# Patient Record
Sex: FEMALE | Race: BLACK OR AFRICAN AMERICAN | NOT HISPANIC OR LATINO | Employment: STUDENT | URBAN - METROPOLITAN AREA
[De-identification: names, ages, dates, MRNs, and addresses within clinical notes are randomized per-mention and may not be internally consistent; named-entity substitution may affect disease eponyms.]

---
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
## 2022-01-08 NOTE — PROGRESS NOTES
Assessment/Plan:    Microbiology Results:    Wound Culture 2+ Growth of Proteus mirabilis Abnormal        2+ Growth of     Mixed Skin Ledy            GRAM STAIN RESULT   Abnormal   2+ Polys      1+ Gram negative rods                Susceptibility     Proteus mirabilis     FAMILIA     Ampicillin ($$) <=8 00 ug/ml Susceptible     Aztreonam ($$$)  <=4 ug/ml Susceptible     Cefazolin ($) <=2 00 ug/ml Susceptible     Ciprofloxacin ($)  <=0 25 ug/ml Susceptible     Gentamicin ($$) <=2 ug/ml Susceptible     Levofloxacin ($) <=0 50 ug/ml Susceptible     Tetracycline >8 ug/ml Resistant     Tobramycin ($) <=2 ug/ml Susceptible     Trimethoprim + Sulfamethoxazole ($$$) <=0 5/9 5 u  Susceptible     ZID Performed Yes                      1  Axillary abscess      Wound has healed nicely  She is discharged and can followup if needed  Subjective:      Patient ID: Francisco Santillan is a 25 y o  female  Triage Notes:    Beni Card is following up for wound check after operative incision and debridement of a large left axillary abscess  She reports doing well  Her mom also thinks her wound has healed  She has not had any ongoing pain, fevers/chills or drainage  The following portions of the patient's history were reviewed and updated as appropriate: allergies, current medications, past family history, past medical history, past social history, past surgical history and problem list     Review of Systems      Objective:      /84   Pulse (!) 108   Temp 98 °F (36 7 °C) (Temporal)   Resp 16   Ht 5' 11" (1 803 m)   Wt 114 kg (252 lb)   BMI 35 15 kg/m²     Below is the patient's most recent value for Albumin, ALT, AST, BUN, Calcium, Chloride, Cholesterol, CO2, Creatinine, GFR, Glucose, HDL, Hematocrit, Hemoglobin, Hemoglobin A1C, LDL, Magnesium, Phosphorus, Platelets, Potassium, PSA, Sodium, Triglycerides, and WBC     Lab Results   Component Value Date    ALT 92 (H) 10/26/2021     (H) 10/26/2021    BUN 6 10/26/2021 CALCIUM 9 1 10/26/2021     10/26/2021    CO2 23 10/26/2021    CREATININE 0 76 10/26/2021    HCT 29 3 (L) 10/27/2021    HGB 9 1 (L) 10/27/2021    HGBA1C 9 3 (H) 10/23/2021    MG 2 5 10/26/2021    PHOS 5 1 (H) 10/26/2021     10/27/2021    K 3 4 (L) 10/26/2021    WBC 6 85 10/27/2021     Note: for a comprehensive list of the patient's lab results, access the Results Review activity  Physical Exam  Vitals and nursing note reviewed  Constitutional:       General: She is not in acute distress  Appearance: She is well-developed  She is not diaphoretic  HENT:      Head: Normocephalic and atraumatic  Eyes:      Pupils: Pupils are equal, round, and reactive to light  Cardiovascular:      Rate and Rhythm: Normal rate and regular rhythm  Pulmonary:      Effort: Pulmonary effort is normal  No respiratory distress  Abdominal:      Palpations: Abdomen is soft  Musculoskeletal:         General: Normal range of motion  Cervical back: Normal range of motion and neck supple  Skin:     General: Skin is warm and dry  Comments: There is no cellulitis  No active drainage  Wound has closed  No cover needed  Neurological:      Mental Status: She is alert and oriented to person, place, and time               Procedures

## 2023-09-20 ENCOUNTER — OFFICE VISIT (OUTPATIENT)
Dept: URGENT CARE | Facility: CLINIC | Age: 23
End: 2023-09-20
Payer: COMMERCIAL

## 2023-09-20 VITALS
TEMPERATURE: 97.6 F | RESPIRATION RATE: 20 BRPM | OXYGEN SATURATION: 98 % | HEART RATE: 118 BPM | SYSTOLIC BLOOD PRESSURE: 168 MMHG | DIASTOLIC BLOOD PRESSURE: 88 MMHG

## 2023-09-20 DIAGNOSIS — B96.89 LOCALIZED BACTERIAL SKIN INFECTION: ICD-10-CM

## 2023-09-20 DIAGNOSIS — L02.214 ABSCESS OF LEFT GROIN: Primary | ICD-10-CM

## 2023-09-20 DIAGNOSIS — L08.9 LOCALIZED BACTERIAL SKIN INFECTION: ICD-10-CM

## 2023-09-20 PROCEDURE — 99213 OFFICE O/P EST LOW 20 MIN: CPT | Performed by: NURSE PRACTITIONER

## 2023-09-20 PROCEDURE — 10060 I&D ABSCESS SIMPLE/SINGLE: CPT | Performed by: NURSE PRACTITIONER

## 2023-09-20 RX ORDER — MULTIVIT WITH MINERALS/LUTEIN
1000 TABLET ORAL DAILY
COMMUNITY

## 2023-09-20 RX ORDER — SULFAMETHOXAZOLE AND TRIMETHOPRIM 800; 160 MG/1; MG/1
1 TABLET ORAL EVERY 12 HOURS SCHEDULED
Qty: 20 TABLET | Refills: 0 | Status: SHIPPED | OUTPATIENT
Start: 2023-09-20 | End: 2023-09-30

## 2023-09-20 RX ORDER — COVID-19 ANTIGEN TEST
KIT MISCELLANEOUS
COMMUNITY

## 2023-09-20 RX ORDER — DIPHENOXYLATE HYDROCHLORIDE AND ATROPINE SULFATE 2.5; .025 MG/1; MG/1
1 TABLET ORAL DAILY
COMMUNITY

## 2023-09-20 RX ORDER — VIT C/B6/B5/MAGNESIUM/HERB 173 50-5-6-5MG
CAPSULE ORAL
COMMUNITY

## 2023-09-20 RX ORDER — CEPHALEXIN 500 MG/1
500 CAPSULE ORAL EVERY 8 HOURS SCHEDULED
Qty: 21 CAPSULE | Refills: 0 | Status: SHIPPED | OUTPATIENT
Start: 2023-09-20 | End: 2023-09-27

## 2023-09-20 RX ORDER — CHLORAL HYDRATE 500 MG
1000 CAPSULE ORAL DAILY
COMMUNITY

## 2023-09-20 NOTE — LETTER
September 20, 2023     Patient: Woodrow Felix   YOB: 2000   Date of Visit: 9/20/2023       To Whom It May Concern: It is my medical opinion that Woodrow Felix may return to work on 9/25. Please excuse for time missed this week . If you have any questions or concerns, please don't hesitate to call.          Sincerely,        JATIN Wahl    CC: No Recipients

## 2023-09-20 NOTE — PATIENT INSTRUCTIONS
After 24 hours, symptoms should not get worse. It may take a few days to start to see improvement. If symptoms are worsening after 24 hours or if you are not seeing improvement, you should be seen again. Abscess   AMBULATORY CARE:   An abscess  is an area under the skin where pus (infected fluid) collects. An abscess is often caused by bacteria, fungi or other germs that get into an open wound. You can get an abscess anywhere on your body. Common signs and symptoms of an abscess: You may have a swollen mass that is red and painful. Pus may leak out of the mass. The pus will be white or yellow and may smell bad. You may have redness and pain days before the mass appears. You may have a fever and chills if the infection spreads. Seek immediate care if:   The area around your abscess becomes very painful, warm, or has red streaks. You have a fever and chills. Your heart is beating faster than usual.    You feel faint or confused. Call your doctor if:   Your abscess gets bigger or does not get better. Your abscess returns. You have questions or concerns about your condition or care. Treatment for an abscess: Your healthcare provider may need to make a cut in the abscess to allow the pus to drain. You may need surgery to remove your abscess. You may  need any of the following:  Antibiotics  help treat a bacterial infection. Acetaminophen  decreases pain and fever. It is available without a doctor's order. Ask how much to take and how often to take it. Follow directions. Read the labels of all other medicines you are using to see if they also contain acetaminophen, or ask your doctor or pharmacist. Acetaminophen can cause liver damage if not taken correctly. NSAIDs , such as ibuprofen, help decrease swelling, pain, and fever. This medicine is available with or without a doctor's order. NSAIDs can cause stomach bleeding or kidney problems in certain people.  If you take blood thinner medicine, always ask your healthcare provider if NSAIDs are safe for you. Always read the medicine label and follow directions. Take your medicine as directed. Contact your healthcare provider if you think your medicine is not helping or if you have side effects. Tell your provider if you are allergic to any medicine. Keep a list of the medicines, vitamins, and herbs you take. Include the amounts, and when and why you take them. Bring the list or the pill bottles to follow-up visits. Carry your medicine list with you in case of an emergency. Self-care:   Apply a warm compress to your abscess. This will help it open and drain. Wet a washcloth in warm, but not hot, water. Apply the compress for 10 minutes. Repeat this 4 times each day. Do not  press on an abscess or try to open it with a needle. You may push the bacteria deeper or into your blood. Do not share your clothes, towels, or sheets with anyone. This can spread the infection to others. Wash your hands often. This can help prevent the spread of germs. Use soap and water or an alcohol-based hand rub. Care for your wound after it is drained:   Care for your wound as directed. If your healthcare provider says it is okay, carefully remove the bandage and gauze packing. You may need to soak the gauze to get it out of your wound. Clean your wound and the area around it as directed. Dry the area and put on new, clean bandages. Change your bandages when they get wet or dirty. Ask your healthcare provider how to change the gauze in your wound. Keep track of how many pieces of gauze are placed inside the wound. Do not put too much packing in the wound. Do not pack the gauze too tightly in your wound. Follow up with your healthcare provider in 1 to 3 days: You may need to have your packing removed or your bandage changed. Write down your questions so you remember to ask them during your visits.   © Copyright [employer] 2023 Information is for End User's use only and may not be sold, redistributed or otherwise used for commercial purposes. The above information is an  only. It is not intended as medical advice for individual conditions or treatments. Talk to your doctor, nurse or pharmacist before following any medical regimen to see if it is safe and effective for you.

## 2023-09-20 NOTE — PROGRESS NOTES
[facility] [place] [facility]        NAME: Jaren Johnston is a 26 y.o. female  : 2000    MRN: [id number]  DATE: 2023  TIME: 10:50 AM      Assessment and Plan     Abscess of left groin [L02.214]  1. Abscess of left groin  sulfamethoxazole-trimethoprim (BACTRIM DS) 800-160 mg per tablet    cephalexin (KEFLEX) 500 mg capsule    Incision and drain      2. Localized bacterial skin infection  sulfamethoxazole-trimethoprim (BACTRIM DS) 800-160 mg per tablet    cephalexin (KEFLEX) 500 mg capsule    Incision and drain        Incision and drain    Date/Time: 2023 10:20 AM    Performed by: JATIN Guerrero  Authorized by: JATIN Guerrero  Universal Protocol:  Consent: Verbal consent obtained. Risks and benefits: risks, benefits and alternatives were discussed  Consent given by: patient  Time out: Immediately prior to procedure a "time out" was called to verify the correct patient, procedure, equipment, support staff and site/side marked as required. Timeout called at: 2023 10:20 AM.  Patient understanding: patient states understanding of the procedure being performed  Required items: required blood products, implants, devices, and special equipment available  Patient identity confirmed: verbally with patient      Patient location:  Clinic  Location:     Type:  Abscess    Size:  4 cm x 3 cm    Location:  Trunk    Trunk location:  Abdomen (left upper suprapubic)  Pre-procedure details:     Skin preparation:  Betadine  Anesthesia (see MAR for exact dosages): Anesthesia method: freeze spray. Procedure details:     Complexity:  Simple    Needle aspiration: no      Incision types:  Stab incision    Scalpel blade:  15    Approach:  Puncture    Incision depth:  Subcutaneous    Wound management:  Irrigated with saline    Irrigation with saline:  10    Drainage:  Bloody    Drainage amount:   Moderate    Wound treatment:  Wound left open (folded 4x4 and tape placed over top)    Packing materials:  None  Post-procedure details:     Patient tolerance of procedure: Tolerated well, no immediate complications      Patient verbally instructed to do warm compresses several times per day. Patient Instructions     Patient Instructions     After 24 hours, symptoms should not get worse. It may take a few days to start to see improvement. If symptoms are worsening after 24 hours or if you are not seeing improvement, you should be seen again. Abscess   AMBULATORY CARE:   An abscess  is an area under the skin where pus (infected fluid) collects. An abscess is often caused by bacteria, fungi or other germs that get into an open wound. You can get an abscess anywhere on your body. Common signs and symptoms of an abscess: You may have a swollen mass that is red and painful. Pus may leak out of the mass. The pus will be white or yellow and may smell bad. You may have redness and pain days before the mass appears. You may have a fever and chills if the infection spreads. Seek immediate care if:   • The area around your abscess becomes very painful, warm, or has red streaks. • You have a fever and chills. • Your heart is beating faster than usual.    • You feel faint or confused. Call your doctor if:   • Your abscess gets bigger or does not get better. • Your abscess returns. • You have questions or concerns about your condition or care. Treatment for an abscess: Your healthcare provider may need to make a cut in the abscess to allow the pus to drain. You may need surgery to remove your abscess. You may  need any of the following:  • Antibiotics  help treat a bacterial infection. • Acetaminophen  decreases pain and fever. It is available without a doctor's order. Ask how much to take and how often to take it. Follow directions.  Read the labels of all other medicines you are using to see if they also contain acetaminophen, or ask your doctor or pharmacist. Acetaminophen can cause liver damage if not taken correctly. • NSAIDs , such as ibuprofen, help decrease swelling, pain, and fever. This medicine is available with or without a doctor's order. NSAIDs can cause stomach bleeding or kidney problems in certain people. If you take blood thinner medicine, always ask your healthcare provider if NSAIDs are safe for you. Always read the medicine label and follow directions. • Take your medicine as directed. Contact your healthcare provider if you think your medicine is not helping or if you have side effects. Tell your provider if you are allergic to any medicine. Keep a list of the medicines, vitamins, and herbs you take. Include the amounts, and when and why you take them. Bring the list or the pill bottles to follow-up visits. Carry your medicine list with you in case of an emergency. Self-care:   • Apply a warm compress to your abscess. This will help it open and drain. Wet a washcloth in warm, but not hot, water. Apply the compress for 10 minutes. Repeat this 4 times each day. Do not  press on an abscess or try to open it with a needle. You may push the bacteria deeper or into your blood. • Do not share your clothes, towels, or sheets with anyone. This can spread the infection to others. • Wash your hands often. This can help prevent the spread of germs. Use soap and water or an alcohol-based hand rub. Care for your wound after it is drained:   • Care for your wound as directed. If your healthcare provider says it is okay, carefully remove the bandage and gauze packing. You may need to soak the gauze to get it out of your wound. Clean your wound and the area around it as directed. Dry the area and put on new, clean bandages. Change your bandages when they get wet or dirty. • Ask your healthcare provider how to change the gauze in your wound. Keep track of how many pieces of gauze are placed inside the wound. Do not put too much packing in the wound.  Do not pack the gauze too tightly in your wound. Follow up with your healthcare provider in 1 to 3 days: You may need to have your packing removed or your bandage changed. Write down your questions so you remember to ask them during your visits. © Copyright Claudia Dhillon 2023 Information is for End User's use only and may not be sold, redistributed or otherwise used for commercial purposes. The above information is an  only. It is not intended as medical advice for individual conditions or treatments. Talk to your doctor, nurse or pharmacist before following any medical regimen to see if it is safe and effective for you. Follow up with PCP in 3-5 days. Proceed to  ER if symptoms worsen. Chief Complaint     Chief Complaint   Patient presents with   • Abdominal Pain     Pain to left lower abdomen x7 days. Lump to left lower abdomen x7 days. Patient reports it was a rash and first and then became lump. Had this before underneath arm pit. Lump is progressively getting larger. 5/10 pain to area. Describes a warm sensation at site         History of Present Illness     Patient presents accompanied by mother. Patient reports an area LLQ/upper left suprapubic that initially looked like a rash but has become a lump--reddened, warm, painful. This seems to be getting larger. Castor oil to the site has not relieved symptoms. Patient thinks this started as an ingrown hair. They note something similar in 2021 in her armpit. They also note the initial medication (doxy) did not work and that she had to be switched after going to the hospital (bactrim and keflex). Has taken aleve, 1-2 tabs every 12 hours prn a few times with slight improvement in pain. Review of Systems     Review of Systems   Skin: Positive for color change. Painful lump   All other systems reviewed and are negative.         Current Medications       Current Outpatient Medications:   •  Ascorbic Acid (vitamin C) 1000 MG tablet, Take 1,000 mg by mouth daily, Disp: , Rfl:   •  cephalexin (KEFLEX) 500 mg capsule, Take 1 capsule (500 mg total) by mouth every 8 (eight) hours for 7 days, Disp: 21 capsule, Rfl: 0  •  multivitamin (THERAGRAN) TABS, Take 1 tablet by mouth daily, Disp: , Rfl:   •  Naproxen Sodium (Aleve) 220 MG CAPS, Take by mouth, Disp: , Rfl:   •  Omega-3 Fatty Acids (fish oil) 1,000 mg, Take 1,000 mg by mouth daily, Disp: , Rfl:   •  sulfamethoxazole-trimethoprim (BACTRIM DS) 800-160 mg per tablet, Take 1 tablet by mouth every 12 (twelve) hours for 10 days, Disp: 20 tablet, Rfl: 0  •  Turmeric 500 MG CAPS, Take by mouth, Disp: , Rfl:   •  ibuprofen (MOTRIN) 400 mg tablet, Take 1 tablet (400 mg total) by mouth every 6 (six) hours as needed for mild pain, fever or headaches for up to 5 days (Patient not taking: Reported on 9/20/2023), Disp: 20 tablet, Rfl: 0    Current Allergies     Allergies as of 09/20/2023   • (No Known Allergies)              The following portions of the patient's history were reviewed and updated as appropriate: allergies, current medications, past family history, past medical history, past social history, past surgical history and problem list.     History reviewed. No pertinent past medical history. Past Surgical History:   Procedure Laterality Date   • INCISION AND DRAINAGE OF WOUND Right 10/26/2021    Procedure: INCISION AND DRAINAGE (I&D) EXTREMITY;  Surgeon: Aman Penn MD;  Location: [facility];  Service: General       Family History   Problem Relation Age of Onset   • Asthma Father         as a child   • Hypertension Mother    • Hypertension Paternal Grandmother    • Hypertension Paternal Grandfather    • Hypertension Maternal Grandmother    • Hypertension Maternal Grandfather    • No Known Problems Sister    • No Known Problems Sister    • No Known Problems Sister          Medications have been verified.         Objective     /88   Pulse (!) 118   Temp 97.6 °F (36.4 °C)   Resp 20   LMP 09/11/2023 (Approximate)   SpO2 98%   Patient's last menstrual period was 09/11/2023 (approximate). Physical Exam     Physical Exam  Vitals and nursing note reviewed. Constitutional:       General: She is not in acute distress. Appearance: She is well-developed. She is not ill-appearing, toxic-appearing or diaphoretic. HENT:      Head: Normocephalic and atraumatic. Eyes:      Pupils: Pupils are equal, round, and reactive to light. Pulmonary:      Effort: Pulmonary effort is normal. No respiratory distress. Abdominal:      General: There is no distension. Palpations: Abdomen is soft. Musculoskeletal:         General: Normal range of motion. Cervical back: Normal range of motion and neck supple. Skin:     General: Skin is warm and dry. Capillary Refill: Capillary refill takes less than 2 seconds. Findings: Abscess and erythema present. Neurological:      General: No focal deficit present. Mental Status: She is alert and oriented to person, place, and time. Psychiatric:         Mood and Affect: Mood normal.         Behavior: Behavior normal.         Thought Content:  Thought content normal.         Judgment: Judgment normal.

## 2024-05-05 ENCOUNTER — AMB VIDEO VISIT (OUTPATIENT)
Dept: OTHER | Facility: HOSPITAL | Age: 24
End: 2024-05-05

## 2024-05-05 VITALS — BODY MASS INDEX: 36.57 KG/M2 | RESPIRATION RATE: 18 BRPM | WEIGHT: 270 LBS | HEIGHT: 72 IN | HEART RATE: 116 BPM

## 2024-05-05 DIAGNOSIS — L02.214 ABSCESS OF RIGHT GROIN: Primary | ICD-10-CM

## 2024-05-05 DIAGNOSIS — E11.9 TYPE 2 DIABETES MELLITUS WITHOUT COMPLICATION, WITHOUT LONG-TERM CURRENT USE OF INSULIN (HCC): ICD-10-CM

## 2024-05-05 PROBLEM — E87.20 LACTIC ACIDOSIS: Status: RESOLVED | Noted: 2021-10-24 | Resolved: 2024-05-05

## 2024-05-05 PROBLEM — R73.09 ELEVATED RANDOM BLOOD GLUCOSE LEVEL: Status: RESOLVED | Noted: 2021-10-24 | Resolved: 2024-05-05

## 2024-05-05 PROBLEM — L03.111 CELLULITIS OF RIGHT AXILLA: Status: RESOLVED | Noted: 2021-10-24 | Resolved: 2024-05-05

## 2024-05-05 PROCEDURE — ECARE PR SL URGENT CARE VIRTUAL VISIT: Performed by: PHYSICIAN ASSISTANT

## 2024-05-05 RX ORDER — SULFAMETHOXAZOLE AND TRIMETHOPRIM 800; 160 MG/1; MG/1
1 TABLET ORAL EVERY 12 HOURS SCHEDULED
Qty: 14 TABLET | Refills: 0 | Status: SHIPPED | OUTPATIENT
Start: 2024-05-05 | End: 2024-05-12

## 2024-05-05 NOTE — PATIENT INSTRUCTIONS
"[facility] Phone number is [phone] if you need assistance or have further questions  note in \"Letters\" section of [facility] courtney. Can print if opened from a web browser    Advised patient to go for repeat fasting labs and f/u with PCP. Explained uncontrolled diabetes puts her at increased risk for developing these infections.     You can go to any outpatient [facility]'s Lab to get the blood work drawn  Just provide your name and date of birth at registration and they will be able to pull up the orders.  You can search for a lab using [employer] \"Find Care\" and filter by \"Labs\" or click the link below:  [email or web address]    Call [phone], option 2 to request a mobile lab visit to your home    The results will go directly to your [facility] courtney under \"Test Results\"  You should be able to screenshot the results, or you can print if opened from a web browser    Go to ER for worsening pain, fevers, or no improvement after 2-3 days on antibiotics  "

## 2024-05-05 NOTE — LETTER
May 5, 2024     Patient: Yamilex Wooten   YOB: 2000   Date of Visit: 5/5/2024       To Whom it May Concern:    Yamilex Wooten is under my professional care. She was seen virtually on 5/5/2024. She may return to school on 5/13/24 and may return to work on 5/13/24 .    If you have any questions or concerns, please don't hesitate to call.         Sincerely,          Shannon D Severino, PA-C        CC: No Recipients

## 2024-05-05 NOTE — CARE ANYWHERE EVISITS
Visit Summary for JOHN TREJO - Gender: Female - Date of Birth: 2000  Date: [id number] - Duration: 14 minutes  Patient: JOHN TREJO  Provider: Shannon Severino PA-C    Patient Contact Information  Address  [address] [place]; [place] [phone]    Visit Topics    Triage Questions   What is your current physical address in the event of a medical emergency? Answer []  Are you allergic to any medications? Answer []  Are you now or could you be pregnant? Answer []  Do you have any immune system compromise or chronic lung   disease? Answer []  Do you have any vulnerable family members in the home (infant, pregnant, cancer, elderly)? Answer []     Conversation Transcripts  [0A][0A] [Notification] You are connected with Shannon Severino PA-C, Urgent Care Specialist.[0A][Notification] JOHN TREJO is located in [place].[0A][Notification] JOHN TREJO has shared health history...[0A]    Diagnosis  Cutaneous abscess of groin  Type 2 diabetes mellitus without complications    Procedures  Value: [id number] Code: CPT-4 UNLISTED E&M SERVICE    Medications Prescribed    No prescriptions ordered    Electronically signed by: Severino PA-C, Shannon(NPI [id number])

## 2024-05-12 ENCOUNTER — AMB VIDEO VISIT (OUTPATIENT)
Dept: OTHER | Facility: HOSPITAL | Age: 24
End: 2024-05-12

## 2024-05-12 DIAGNOSIS — E11.9 TYPE 2 DIABETES MELLITUS WITHOUT COMPLICATION, WITHOUT LONG-TERM CURRENT USE OF INSULIN (HCC): ICD-10-CM

## 2024-05-12 DIAGNOSIS — L02.214 ABSCESS OF RIGHT GROIN: Primary | ICD-10-CM

## 2024-05-12 PROCEDURE — ECARE PR SL URGENT CARE VIRTUAL VISIT: Performed by: PHYSICIAN ASSISTANT

## 2024-05-12 NOTE — PATIENT INSTRUCTIONS
Instructed pt to follow up with PCP tomorrow, call the office first thing for appt  If not improving pt needs to have in-person eval, possible culture, etc.  C/w antibiotic, recommend taking it with food and a probiotic for side effects  Keep area clean and dry  Monitor for worsening symptoms including fever  Work note given x 1 day at pt's request    Abscess   AMBULATORY CARE:   An abscess  is an area under the skin where pus (infected fluid) collects. An abscess is often caused by bacteria, fungi or other germs that get into an open wound. You can get an abscess anywhere on your body.       Common signs and symptoms of an abscess:  You may have a swollen mass that is red and painful. Pus may leak out of the mass. The pus will be white or yellow and may smell bad. You may have redness and pain days before the mass appears. You may have a fever and chills if the infection spreads.  Seek immediate care if:   The area around your abscess becomes very painful, warm, or has red streaks.    You have a fever and chills.    Your heart is beating faster than usual.    You feel faint or confused.    Call your doctor if:   Your abscess gets bigger or does not get better.    Your abscess returns.    You have questions or concerns about your condition or care.    Treatment for an abscess:  Your healthcare provider may need to make a cut in the abscess to allow the pus to drain. You may need surgery to remove your abscess. You may  need any of the following:  Antibiotics  help treat a bacterial infection.    Acetaminophen  decreases pain and fever. It is available without a doctor's order. Ask how much to take and how often to take it. Follow directions. Read the labels of all other medicines you are using to see if they also contain acetaminophen, or ask your doctor or pharmacist. Acetaminophen can cause liver damage if not taken correctly.    NSAIDs , such as ibuprofen, help decrease swelling, pain, and fever. This medicine is  available with or without a doctor's order. NSAIDs can cause stomach bleeding or kidney problems in certain people. If you take blood thinner medicine, always ask your healthcare provider if NSAIDs are safe for you. Always read the medicine label and follow directions.    Take your medicine as directed.  Contact your healthcare provider if you think your medicine is not helping or if you have side effects. Tell your provider if you are allergic to any medicine. Keep a list of the medicines, vitamins, and herbs you take. Include the amounts, and when and why you take them. Bring the list or the pill bottles to follow-up visits. Carry your medicine list with you in case of an emergency.    Self-care:   Apply a warm compress to your abscess.  This will help it open and drain. Wet a washcloth in warm, but not hot, water. Apply the compress for 10 minutes. Repeat this 4 times each day. Do not  press on an abscess or try to open it with a needle. You may push the bacteria deeper or into your blood.    Do not share your clothes, towels, or sheets with anyone.  This can spread the infection to others.    Wash your hands often.  This can help prevent the spread of germs. Use soap and water or an alcohol-based hand rub.       Care for your wound after it is drained:   Care for your wound as directed.  If your healthcare provider says it is okay, carefully remove the bandage and gauze packing. You may need to soak the gauze to get it out of your wound. Clean your wound and the area around it as directed. Dry the area and put on new, clean bandages. Change your bandages when they get wet or dirty.    Ask your healthcare provider how to change the gauze in your wound.  Keep track of how many pieces of gauze are placed inside the wound. Do not put too much packing in the wound. Do not pack the gauze too tightly in your wound.    Follow up with your healthcare provider in 1 to 3 days:  You may need to have your packing removed or  your bandage changed. Write down your questions so you remember to ask them during your visits.  © Copyright [employer] 2023 Information is for End User's use only and may not be sold, redistributed or otherwise used for commercial purposes.  The above information is an  only. It is not intended as medical advice for individual conditions or treatments. Talk to your doctor, nurse or pharmacist before following any medical regimen to see if it is safe and effective for you.

## 2024-05-12 NOTE — LETTER
May 12, 2024     Patient: Yamilex Wooten  YOB: 2000  Date of Visit: 5/12/2024      To Whom it May Concern:    Yamilex Wooten was seen via Telemedicine on 5/12/2024. Yamilex may return to work on 5/14/2024 .        Sincerely,          Betsy Acosta PA-C        CC: No Recipients

## 2024-05-12 NOTE — CARE ANYWHERE EVISITS
Visit Summary for JOHN TREJO - Gender: Female - Date of Birth: 2000  Date: [id number] - Duration: 4 minutes  Patient: JOHN TREJO  Provider: Betsy Acosta PA-C    Patient Contact Information  Address  [address] [place]; [place] [phone]    Visit Topics    Triage Questions   What is your current physical address in the event of a medical emergency? Answer []  Are you allergic to any medications? Answer []  Are you now or could you be pregnant? Answer []  Do you have any immune system compromise or chronic lung   disease? Answer []  Do you have any vulnerable family members in the home (infant, pregnant, cancer, elderly)? Answer []     Conversation Transcripts  [0A][0A] [Notification] You are connected with Betsy Acosta PA-C, Urgent Care Specialist.[0A][Notification] JOHN TREJO is located in [place].[0A][Notification] JOHN TREJO has shared health history...[0A]    Diagnosis  Cutaneous abscess of groin  Type 2 diabetes mellitus without complications    Procedures  Value: [id number] Code: CPT-4 UNLISTED E&M SERVICE    Medications Prescribed    No prescriptions ordered    Electronically signed by: Betsy Acosta PA-C(NPI [id number])

## 2024-05-12 NOTE — PROGRESS NOTES
Video Visit - Yamilex Wooten 24 y.o. female MRN: [id number]    REQUIRED DOCUMENTATION:     At address in chart    1. This service was provided via [employer].  2. Provider located at [address] [id number] [phone].  3. [facility] provider: Betsy Acosta PA-C.  4. Identify all parties in room with patient during [facility] visit:  Patient alone  5. After connecting through [employer], patient was identified by name and date of birth.  Patient was then informed that this was a Telemedicine visit and that the exam was being conducted confidentially over secure lines.  My office door was closed. No one else was in the room.  Patient acknowledged consent and understanding of privacy and security of the Telemedicine visit.  I informed the patient that I have reviewed their record in Epic and presented the opportunity for them to ask any questions regarding the visit today.  The patient agreed to participate.      Patient presents w/ c/o draining groin abscess x more than one week. Pt reports she is taking the antibiotic although she is experiencing some brief nausea w/ it, notes it resolves w/in a few minutes. Pt denies f/c/s, and other symptoms. She requests a note to be excused from work. Pt denies seeing her PCP since the last visit.       Review of Systems   Constitutional:  Negative for chills and fever.   HENT:  Negative for ear pain and sore throat.    Eyes:  Negative for pain and visual disturbance.   Respiratory:  Negative for cough and shortness of breath.    Cardiovascular:  Negative for chest pain and palpitations.   Gastrointestinal:  Negative for abdominal pain and vomiting.   Genitourinary:  Negative for dysuria and hematuria.   Musculoskeletal:  Negative for arthralgias and back pain.   Skin:  Positive for wound. Negative for color change and rash.   Neurological:  Negative for seizures and syncope.   All other systems reviewed and are negative.    Physical Exam  Vitals  and nursing note reviewed.   Constitutional:       General: She is not in acute distress.     Appearance: Normal appearance. She is well-developed. She is not ill-appearing or diaphoretic.   HENT:      Head: Normocephalic and atraumatic.      Nose: Nose normal.      Mouth/Throat:      Mouth: Mucous membranes are moist.      Pharynx: Oropharynx is clear.   Eyes:      General:         Right eye: No discharge.         Left eye: No discharge.      Conjunctiva/sclera: Conjunctivae normal.   Pulmonary:      Effort: Pulmonary effort is normal. No respiratory distress.      Breath sounds: Normal breath sounds.   Musculoskeletal:      Cervical back: Neck supple.   Skin:     General: Skin is dry.      Findings: No rash.      Comments: Of face and neck   Neurological:      Mental Status: She is alert and oriented to person, place, and time.   Psychiatric:         Behavior: Behavior normal.         Thought Content: Thought content normal.       Diagnoses and all orders for this visit:    Abscess of right groin    Type 2 diabetes mellitus without complication, without long-term current use of insulin ([facility])      Patient Instructions   Instructed pt to follow up with PCP tomorrow, call the office first thing for appt  C/w antibiotic, recommend taking it with food and a probiotic for side effects  Keep area clean and dry  Monitor for worsening symptoms including fever  Work note given x 1 day at pt's request    Abscess   AMBULATORY CARE:   An abscess  is an area under the skin where pus (infected fluid) collects. An abscess is often caused by bacteria, fungi or other germs that get into an open wound. You can get an abscess anywhere on your body.       Common signs and symptoms of an abscess:  You may have a swollen mass that is red and painful. Pus may leak out of the mass. The pus will be white or yellow and may smell bad. You may have redness and pain days before the mass appears. You may have a fever and chills if the infection  spreads.  Seek immediate care if:   The area around your abscess becomes very painful, warm, or has red streaks.    You have a fever and chills.    Your heart is beating faster than usual.    You feel faint or confused.    Call your doctor if:   Your abscess gets bigger or does not get better.    Your abscess returns.    You have questions or concerns about your condition or care.    Treatment for an abscess:  Your healthcare provider may need to make a cut in the abscess to allow the pus to drain. You may need surgery to remove your abscess. You may  need any of the following:  Antibiotics  help treat a bacterial infection.    Acetaminophen  decreases pain and fever. It is available without a doctor's order. Ask how much to take and how often to take it. Follow directions. Read the labels of all other medicines you are using to see if they also contain acetaminophen, or ask your doctor or pharmacist. Acetaminophen can cause liver damage if not taken correctly.    NSAIDs , such as ibuprofen, help decrease swelling, pain, and fever. This medicine is available with or without a doctor's order. NSAIDs can cause stomach bleeding or kidney problems in certain people. If you take blood thinner medicine, always ask your healthcare provider if NSAIDs are safe for you. Always read the medicine label and follow directions.    Take your medicine as directed.  Contact your healthcare provider if you think your medicine is not helping or if you have side effects. Tell your provider if you are allergic to any medicine. Keep a list of the medicines, vitamins, and herbs you take. Include the amounts, and when and why you take them. Bring the list or the pill bottles to follow-up visits. Carry your medicine list with you in case of an emergency.    Self-care:   Apply a warm compress to your abscess.  This will help it open and drain. Wet a washcloth in warm, but not hot, water. Apply the compress for 10 minutes. Repeat this 4 times  each day. Do not  press on an abscess or try to open it with a needle. You may push the bacteria deeper or into your blood.    Do not share your clothes, towels, or sheets with anyone.  This can spread the infection to others.    Wash your hands often.  This can help prevent the spread of germs. Use soap and water or an alcohol-based hand rub.       Care for your wound after it is drained:   Care for your wound as directed.  If your healthcare provider says it is okay, carefully remove the bandage and gauze packing. You may need to soak the gauze to get it out of your wound. Clean your wound and the area around it as directed. Dry the area and put on new, clean bandages. Change your bandages when they get wet or dirty.    Ask your healthcare provider how to change the gauze in your wound.  Keep track of how many pieces of gauze are placed inside the wound. Do not put too much packing in the wound. Do not pack the gauze too tightly in your wound.    Follow up with your healthcare provider in 1 to 3 days:  You may need to have your packing removed or your bandage changed. Write down your questions so you remember to ask them during your visits.  © Copyright [employer] 2023 Information is for End User's use only and may not be sold, redistributed or otherwise used for commercial purposes.  The above information is an  only. It is not intended as medical advice for individual conditions or treatments. Talk to your doctor, nurse or pharmacist before following any medical regimen to see if it is safe and effective for you.      Follow up with PCP if not improved, if symptoms are worse, go to the ER.

## (undated) DEVICE — NEEDLE 25G X 1 1/2

## (undated) DEVICE — LIGACLIP MCA MULTIPLE CLIP APPLIERS, 20 MEDIUM CLIPS: Brand: LIGACLIP

## (undated) DEVICE — SPONGE STICK WITH PVP-I: Brand: KENDALL

## (undated) DEVICE — GLOVE SRG BIOGEL 7

## (undated) DEVICE — BETHLEHEM UNIVERSAL MINOR GEN: Brand: CARDINAL HEALTH

## (undated) DEVICE — GAUZE SPONGES,16 PLY: Brand: CURITY

## (undated) DEVICE — CHLORAPREP HI-LITE 26ML ORANGE

## (undated) DEVICE — TUBING SUCTION 5MM X 12 FT

## (undated) DEVICE — STRETCH BANDAGE: Brand: CURITY

## (undated) DEVICE — CURITY PLAIN PACKING STRIP: Brand: CURITY

## (undated) DEVICE — PAD GROUNDING ADULT

## (undated) DEVICE — DRAPE EQUIPMENT RF WAND

## (undated) DEVICE — LIGHT HANDLE COVER SLEEVE DISP BLUE STELLAR

## (undated) DEVICE — SCD SEQUENTIAL COMPRESSION COMFORT SLEEVE MEDIUM KNEE LENGTH: Brand: KENDALL SCD

## (undated) DEVICE — SURGICEL FIBRILLAR 1 X 2

## (undated) DEVICE — POOLE SUCTION HANDLE: Brand: CARDINAL HEALTH

## (undated) DEVICE — ABDOMINAL PAD: Brand: DERMACEA

## (undated) DEVICE — INTENDED FOR TISSUE SEPARATION, AND OTHER PROCEDURES THAT REQUIRE A SHARP SURGICAL BLADE TO PUNCTURE OR CUT.: Brand: BARD-PARKER SAFETY BLADES SIZE 15, STERILE